# Patient Record
Sex: MALE | Race: BLACK OR AFRICAN AMERICAN | NOT HISPANIC OR LATINO | URBAN - METROPOLITAN AREA
[De-identification: names, ages, dates, MRNs, and addresses within clinical notes are randomized per-mention and may not be internally consistent; named-entity substitution may affect disease eponyms.]

---

## 2023-11-08 ENCOUNTER — APPOINTMENT (OUTPATIENT)
Dept: URBAN - METROPOLITAN AREA CLINIC 207 | Age: 33
Setting detail: DERMATOLOGY
End: 2023-11-08

## 2023-11-08 DIAGNOSIS — L70.0 ACNE VULGARIS: ICD-10-CM

## 2023-11-08 DIAGNOSIS — L81.4 OTHER MELANIN HYPERPIGMENTATION: ICD-10-CM

## 2023-11-08 PROCEDURE — 99204 OFFICE O/P NEW MOD 45 MIN: CPT

## 2023-11-08 PROCEDURE — OTHER MEDICATION COUNSELING: OTHER

## 2023-11-08 PROCEDURE — OTHER OTC TREATMENT REGIMEN: OTHER

## 2023-11-08 PROCEDURE — OTHER COUNSELING: OTHER

## 2023-11-08 PROCEDURE — OTHER PRESCRIPTION MEDICATION MANAGEMENT: OTHER

## 2023-11-08 PROCEDURE — OTHER PRESCRIPTION: OTHER

## 2023-11-08 RX ORDER — TRETIONIN 0.5 MG/G
CREAM TOPICAL
Qty: 45 | Refills: 2 | Status: ERX | COMMUNITY
Start: 2023-11-08

## 2023-11-08 ASSESSMENT — LOCATION DETAILED DESCRIPTION DERM
LOCATION DETAILED: RIGHT CENTRAL MALAR CHEEK
LOCATION DETAILED: LEFT SUPERIOR MEDIAL MALAR CHEEK
LOCATION DETAILED: LEFT CHIN
LOCATION DETAILED: INFERIOR MID FOREHEAD
LOCATION DETAILED: LEFT CENTRAL MALAR CHEEK

## 2023-11-08 ASSESSMENT — LOCATION SIMPLE DESCRIPTION DERM
LOCATION SIMPLE: CHIN
LOCATION SIMPLE: RIGHT CHEEK
LOCATION SIMPLE: INFERIOR FOREHEAD
LOCATION SIMPLE: LEFT CHEEK
LOCATION SIMPLE: LEFT CHEEK

## 2023-11-08 ASSESSMENT — LOCATION ZONE DERM
LOCATION ZONE: FACE
LOCATION ZONE: FACE

## 2023-11-08 NOTE — PROCEDURE: OTC TREATMENT REGIMEN
Detail Level: Zone
Patient Specific Otc Recommendations (Will Not Stick From Patient To Patient): Wash face with CeraVe cleanser, apply sunscreen (MD 46 clear) and vitamin C (Revision C+) in morning \\nWash face with CeraVe, apply Tretinoin, and moisturizer at night\\n\\nRecommended CeraVe SA Wash for body.\\n\\nAdditional recommendations include:\\nThe ordinary Vitamin C\\nLa Roche-Posay Pure Vitamin C

## 2023-11-08 NOTE — HPI: DISCOLORATION
How Severe Is Your Skin Discoloration?: moderate
Additional History: Pt wants to know how to have more of a clear and even toned complexion. He c/o of large pores around cheeks and nose and is concerned with puffiness under the eyes.

## 2023-11-08 NOTE — PROCEDURE: MEDICATION COUNSELING
- UCx w/Ecoli 01/23- vanc and rocephin- stop and transition to amp- coverage for enterococcus bacteremia  - EOT 02/07/22     Zoryve Counseling:  I discussed with the patient that Zoryve is not for use in the eyes, mouth or vagina. The most commonly reported side effects include diarrhea, headache, insomnia, application site pain, upper respiratory tract infections, and urinary tract infections.  All of the patient's questions and concerns were addressed.

## 2023-11-08 NOTE — PROCEDURE: MEDICATION COUNSELING
Telephone Encounter by Indu Vaca at 12/18/17 08:39 AM     Author:  Indu Vaca Service:  (none) Author Type:       Filed:  12/18/17 08:40 AM Encounter Date:  12/18/2017 Status:  Signed     :  Indu Vaca ()            Left first message on v/m to schedule NPT  with [facility] for darshana  cyst.[PK1.1M]      Revision History        User Key Date/Time User Provider Type Action    > PK1.1 12/18/17 08:40 AM Indu Vaca  Sign    M - Manual             Niacinamide Pregnancy And Lactation Text: These medications are considered safe during pregnancy.